# Patient Record
Sex: FEMALE | Race: WHITE | NOT HISPANIC OR LATINO | Employment: STUDENT | URBAN - METROPOLITAN AREA
[De-identification: names, ages, dates, MRNs, and addresses within clinical notes are randomized per-mention and may not be internally consistent; named-entity substitution may affect disease eponyms.]

---
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
## 2025-01-08 ENCOUNTER — TELEPHONE (OUTPATIENT)
Dept: PODIATRY | Facility: CLINIC | Age: 20
End: 2025-01-08

## 2025-01-08 NOTE — TELEPHONE ENCOUNTER
Tried calling pharmacy twice to speak to someone. They answered both my calls but hung up on me right after answering the phone.

## 2025-01-08 NOTE — TELEPHONE ENCOUNTER
Patients father called Rx refill line and was told by the pharmacy that the dose of her generic Lamasil was to high and they would not give it to them - please verify and call father back - ptwill need Rx asap as she is leaving tomorrow to go back to college

## 2025-01-10 ENCOUNTER — TELEPHONE (OUTPATIENT)
Dept: PODIATRY | Facility: CLINIC | Age: 20
End: 2025-01-10

## 2025-01-10 ENCOUNTER — TELEPHONE (OUTPATIENT)
Age: 20
End: 2025-01-10

## 2025-01-10 NOTE — TELEPHONE ENCOUNTER
[facility] stated that the insurance needs a prior authorization before submitting. This is the pulse dosed regimen. 4wks total treatment with 2pills a day for a week. 1 wk a month for 4 months. Dr. Chiu did review and states that is the correct dosage he wants his patient at.

## 2025-01-10 NOTE — TELEPHONE ENCOUNTER
Pt's dad called to check status of PA advised a message was sent to initiate process. Dad verbalized understanding.

## 2025-01-13 DIAGNOSIS — B35.1 ONYCHOMYCOSIS: Primary | ICD-10-CM

## 2025-01-13 RX ORDER — TERBINAFINE HYDROCHLORIDE 250 MG/1
250 TABLET ORAL 2 TIMES DAILY
Qty: 14 TABLET | Refills: 3 | Status: SHIPPED | OUTPATIENT
Start: 2025-01-13 | End: 2025-01-15 | Stop reason: SDUPTHER

## 2025-01-14 DIAGNOSIS — B35.1 ONYCHOMYCOSIS: ICD-10-CM

## 2025-01-14 NOTE — TELEPHONE ENCOUNTER
Not a duplicate  Pharmacy change    Reason for call:   [x] Refill   [] Prior Auth  [] Other:     Office:   [] PCP/Provider -   [x] Specialty/Provider - Podiatry    Medication: terbinafine (LamISIL) 250 mg tablet     Dose/Frequency: Take 1 tablet (250 mg total) by mouth 2 (two) times a day for 7 days     Quantity: 14    Pharmacy: [id number] - [place] - [facility] [address]      Does the patient have enough for 3 days?   [] Yes   [x] No - Send as HP to POD

## 2025-01-15 DIAGNOSIS — B35.1 ONYCHOMYCOSIS: ICD-10-CM

## 2025-01-15 RX ORDER — TERBINAFINE HYDROCHLORIDE 250 MG/1
250 TABLET ORAL 2 TIMES DAILY
Qty: 14 TABLET | Refills: 0 | OUTPATIENT
Start: 2025-01-15 | End: 2025-01-22

## 2025-01-15 RX ORDER — TERBINAFINE HYDROCHLORIDE 250 MG/1
250 TABLET ORAL 2 TIMES DAILY
Qty: 14 TABLET | Refills: 0 | Status: SHIPPED | OUTPATIENT
Start: 2025-01-15 | End: 2025-01-22

## 2025-01-15 NOTE — TELEPHONE ENCOUNTER
Reason for call:   [x] Refill   [] Prior Auth  [x] Other: Not a duplicate. Pharmacy change[facility] not [facility] medication     Office:   [] PCP/Provider -   [x] Specialty/Provider - Podiatry     Medication: terbinafine (LamISIL) 250 mg tab     Dose/Frequency: 250 mg, 2 times daily     Quantity: 14    [id number]    Does the patient have enough for 3 days?   [] Yes   [x] No - Send as HP to POD

## 2025-01-16 ENCOUNTER — TELEPHONE (OUTPATIENT)
Age: 20
End: 2025-01-16

## 2025-01-16 NOTE — TELEPHONE ENCOUNTER
Patient called the RX Refill Line. Message is being forwarded to the office.     Patient's father is requesting for someone to call the pharmacy and let them know that a PA is not needed. Pharmacy told patients father that either a PA needs to be done or someone needs to call the pharmacy to say a PA is not needed     Please contact patient's father at  [phone] (Demetrius)

## 2025-01-16 NOTE — TELEPHONE ENCOUNTER
Patients father Demetrius called the RX Refill Line. Message is being forwarded to the office.     Demetrius is requesting a message be sent to the office as he received a message from [facility] stating that everything is well, and the medication is okay for . Father called the pharmacy as was told they did not receive this information.    He believes that the person that left the message spoke with the insurance company, but would like the office to contact the pharmacy to ensure this is in fact correct. Please review.    [id number] - [place] - [address]  [address], [place] [address]  Phone: [phone]  Fax: [phone]    terbinafine (LamISIL) 250 mg tablet   Take 1 tablet (250 mg total) by mouth 2 (two) times a day for 7 days   Quantity: 14 tablet       Refills: 0      Please contact patients at [phone] per fathers request.

## 2025-01-16 NOTE — TELEPHONE ENCOUNTER
PA for   CANCELLED due to     []Approval on file-dates approved    [x]Medication already on Formulary  []Brand Name Preferred  []Patient no longer covered by insurance      No further PA activity needed

## 2025-01-17 NOTE — TELEPHONE ENCOUNTER
Patient father Demetrius called again in reference to terbinafine (LamISIL) 250 mg tablet. I read all the notes. I then called the pharmacy and spoke to Tita to see where all the confusion was with this medication needing a PA. So Tita explained, yes this medication is on formulary. Pharmacy need a reason, a justification for the dosage. Meaning.... this medication is normally taking 1 tablet daily. This prescription is written for 2 times a day. They need a justification for the dose.     Please contact patient insurance company and see if this can be expedited. As dad and patient been trying to get this taken care of for a while. Thank you kindly....

## 2025-01-20 NOTE — TELEPHONE ENCOUNTER
Patients father called refill line for an update. I advised the message to resend for PA of the dose was sent to the PA team Friday 1/17/25 with high priority. Advised it looks that this is in process but there is no update at this time. Dad understands that there is a wait with PA's and is grateful for the assistance.        There are no Wet Read(s) to document.

## 2025-01-22 DIAGNOSIS — B35.1 ONYCHOMYCOSIS: ICD-10-CM

## 2025-01-23 ENCOUNTER — TELEPHONE (OUTPATIENT)
Age: 20
End: 2025-01-23

## 2025-01-23 RX ORDER — TERBINAFINE HYDROCHLORIDE 250 MG/1
TABLET ORAL
Qty: 14 TABLET | Refills: 0 | OUTPATIENT
Start: 2025-01-23

## 2025-01-23 NOTE — TELEPHONE ENCOUNTER
I have gotten multiple messages about this, I do not understand why this remains to be a problem. coordinate with my medical assistants please

## 2025-02-11 DIAGNOSIS — B35.1 ONYCHOMYCOSIS: ICD-10-CM

## 2025-02-12 RX ORDER — TERBINAFINE HYDROCHLORIDE 250 MG/1
TABLET ORAL
Qty: 14 TABLET | Refills: 0 | OUTPATIENT
Start: 2025-02-12

## 2025-02-12 NOTE — TELEPHONE ENCOUNTER
Demetrius pt dad called the RX Refill Line. Message is being forwarded to the office.     Patient dad state, he is having problems getting his daughter terbinafine, he said he requested a refill and it was denied, he said when it was last sent to the pharmacy there were no refills on it and she is out of the medication she is [place] and he need it a week early.    Brain says he need the medication for her for this month and next month then she will be over the medication.    He is requesting this month sent with a refill so he doesn't have to go through this again.     Patient advised I will document the chart and send to dr townsend at .  Brain was very understanding and requested a call back to know the status.     Please contact patient dad Demetrius [phone] on his cell should there be any question or concerns and so he also to know the status of the medication once its approved.

## 2025-02-13 ENCOUNTER — TELEPHONE (OUTPATIENT)
Age: 20
End: 2025-02-13

## 2025-02-13 DIAGNOSIS — B35.1 ONYCHOMYCOSIS: Primary | ICD-10-CM

## 2025-02-13 RX ORDER — TERBINAFINE HYDROCHLORIDE 250 MG/1
250 TABLET ORAL 2 TIMES DAILY
Qty: 14 TABLET | Refills: 1 | Status: SHIPPED | OUTPATIENT
Start: 2025-02-13 | End: 2025-02-14

## 2025-02-13 NOTE — TELEPHONE ENCOUNTER
Caller: Demetrius     Doctor and/or Office: Dr. Chiu     #: [phone]    Escalation: Medication patient dad asked if you can sent the rx for Terbinafine to [facility] in [place] [phone]   He stated that [facility] will not fill unless it is a 90 day supply

## 2025-02-13 NOTE — TELEPHONE ENCOUNTER
Caller: Demetrius (dad)     Doctor: Dr. Chiu    Reason for call: Trying to get a refill plus 1 extra refill for the Terbunfen - She is supposed to take 2 pills for 7 days and then stop for 2 days and restart over again 4 times. She has only completed the cycle 2 times so far. Please reach out with any questions    Call back : [phone]

## 2025-02-14 ENCOUNTER — TELEPHONE (OUTPATIENT)
Age: 20
End: 2025-02-14

## 2025-02-14 DIAGNOSIS — B35.1 ONYCHOMYCOSIS: Primary | ICD-10-CM

## 2025-02-14 RX ORDER — TERBINAFINE HYDROCHLORIDE 250 MG/1
250 TABLET ORAL 2 TIMES DAILY
Qty: 14 TABLET | Refills: 1 | Status: SHIPPED | OUTPATIENT
Start: 2025-02-14 | End: 2025-02-21

## 2025-02-14 NOTE — TELEPHONE ENCOUNTER
Caller: Demetrius Castaneda for Dagmar Castaneda    Doctor: Dr. Chiu     Reason for call: Demetrius called to make sure that we call in Dagmar's Terbinifine to the [facility] on [address]. In [place].  She will also need a refill for next month called in as well.   Thank you.    Call back#: [phone]

## 2025-03-16 DIAGNOSIS — F41.9 ANXIETY: ICD-10-CM

## 2025-03-17 DIAGNOSIS — B35.1 ONYCHOMYCOSIS: ICD-10-CM

## 2025-03-18 RX ORDER — TERBINAFINE HYDROCHLORIDE 250 MG/1
TABLET ORAL
Qty: 14 TABLET | Refills: 1 | OUTPATIENT
Start: 2025-03-18

## 2025-05-20 DIAGNOSIS — Z30.9 ENCOUNTER FOR CONTRACEPTIVE MANAGEMENT, UNSPECIFIED TYPE: ICD-10-CM

## 2025-05-20 DIAGNOSIS — F41.9 ANXIETY: ICD-10-CM

## 2025-05-22 RX ORDER — NORGESTIMATE AND ETHINYL ESTRADIOL 0.25-0.035
1 KIT ORAL EVERY MORNING
Qty: 84 TABLET | Refills: 0 | Status: SHIPPED | OUTPATIENT
Start: 2025-05-22

## 2025-08-01 ENCOUNTER — TELEPHONE (OUTPATIENT)
Dept: INTERNAL MEDICINE CLINIC | Facility: CLINIC | Age: 20
End: 2025-08-01

## 2025-08-05 ENCOUNTER — OFFICE VISIT (OUTPATIENT)
Dept: FAMILY MEDICINE CLINIC | Facility: CLINIC | Age: 20
End: 2025-08-05
Payer: COMMERCIAL

## 2025-08-05 VITALS
DIASTOLIC BLOOD PRESSURE: 62 MMHG | HEART RATE: 95 BPM | TEMPERATURE: 97.2 F | OXYGEN SATURATION: 99 % | WEIGHT: 135 LBS | BODY MASS INDEX: 23.05 KG/M2 | HEIGHT: 64 IN | SYSTOLIC BLOOD PRESSURE: 100 MMHG

## 2025-08-05 DIAGNOSIS — Z30.9 ENCOUNTER FOR CONTRACEPTIVE MANAGEMENT, UNSPECIFIED TYPE: ICD-10-CM

## 2025-08-05 DIAGNOSIS — Z00.00 ANNUAL PHYSICAL EXAM: Primary | ICD-10-CM

## 2025-08-05 DIAGNOSIS — Z11.3 SCREENING FOR STDS (SEXUALLY TRANSMITTED DISEASES): ICD-10-CM

## 2025-08-05 DIAGNOSIS — F41.9 ANXIETY: ICD-10-CM

## 2025-08-05 PROCEDURE — 99214 OFFICE O/P EST MOD 30 MIN: CPT | Performed by: NURSE PRACTITIONER

## 2025-08-05 PROCEDURE — 99395 PREV VISIT EST AGE 18-39: CPT | Performed by: NURSE PRACTITIONER

## 2025-08-05 RX ORDER — NORGESTIMATE AND ETHINYL ESTRADIOL 0.25-0.035
1 KIT ORAL EVERY MORNING
Qty: 84 TABLET | Refills: 3 | Status: SHIPPED | OUTPATIENT
Start: 2025-08-05